# Patient Record
Sex: MALE | Race: WHITE
[De-identification: names, ages, dates, MRNs, and addresses within clinical notes are randomized per-mention and may not be internally consistent; named-entity substitution may affect disease eponyms.]

---

## 2019-03-14 ENCOUNTER — HOSPITAL ENCOUNTER (OUTPATIENT)
Dept: HOSPITAL 80 - FCATH | Age: 64
Discharge: HOME | End: 2019-03-14
Attending: INTERNAL MEDICINE
Payer: COMMERCIAL

## 2019-03-14 DIAGNOSIS — I48.1: Primary | ICD-10-CM

## 2019-03-14 DIAGNOSIS — I10: ICD-10-CM

## 2019-03-14 DIAGNOSIS — Z79.01: ICD-10-CM

## 2019-03-14 DIAGNOSIS — G47.30: ICD-10-CM

## 2019-03-14 LAB
INR PPP: 1.24 (ref 0.83–1.16)
PROTHROMBIN TIME: 15.1 SEC (ref 12–15)

## 2019-03-14 PROCEDURE — 5A2204Z RESTORATION OF CARDIAC RHYTHM, SINGLE: ICD-10-PCS | Performed by: INTERNAL MEDICINE

## 2019-03-14 NOTE — ECHO
[email or web address]





[address] [address] 

Main: [phone] 



Echocardiography Examination 

Transthoracic 



Name:            CECE SCHILLING                    MR#:

[id number]

Study Date:      03/14/2019                          Study Time:

12:06 PM

YOB: 1955                          Age:          63

year(s)

Height:          195.6 cm (77 in.)                   Weight:

114.31 kg (252 lb.)

BSA:             2.47 m2                             Gender:

Male

Examination:     Echo                                Contrast: 

Image Quality:   Adequate                            Rhythm: 

Heart Rate:                                          BP:           117

mmHg/86 mmHg

Indication:      New atrial fibrillation 



Procedure Staff 

Referring Physician: 

Echocardiographer:         Karen Arguelles TASH 

Reading Physician:         Castro Ibrahim MD 

Requesting Provider: 

Ordering Physician:        Rk Kapadia 

Indication:                New atrial fibrillation 



Measurements 

Chambers                                           AV/MV 

Label                 Value       Normal Value          Label

Value       Normal Value

IVSd, 2D               0.7 cm     (0.6cm - 1.1cm)       AV PGmean

2 mmHg

LVDd, 2D               4.8 cm     (4.2cm - 5.9cm)       AV Vmax

1.05 m/s

LVDs, 2D               3.4 cm     (2.1cm - 4cm)         MV A Vmax

0.25 m/s

LVEF, 2D               56 %       (54% - 74%)           MV E' lateral

0.12 m/s

LVEF, BP               65 %       (55% - 70%)           MV E' mean

0.1 m/s

LVEF, MOD2             63 %       (55% - 70%)           MV E' septal

0.07 m/s

LVEF, MOD4             67 %       (55% - 70%)           MV E Vmax

0.93 m/s

LVPWd, 2D              0.9 cm     (0.6cm - 1cm)         MV E/A

3.72

LA Area, A2C           31 cm2     (0cm2 - 20cm2)        MV E/E'

lateral      7.7

LA Volume, A2C         111 ml     (18ml - 58ml)         MV E/E' mean

9.79

LA Volume, A4C         104 ml     (16ml - 34ml)         MV E/E' septal

13.4 (0.45 - 1.25)

LA Volume, BP          111 ml     (18ml - 58ml)    TV/PV 

LAD Index, 2D          1.82 cm/m2                       Label

Value       Normal Value

LADs, 2D               4.5 cm     (3cm - 4cm)           RA Pressure

5 mmHg

LAESV index, MOD4      42.1 ml/m2                       RVSP

34 mmHg

Additional Vessels                                      TR Pmax

29 mmHg

Label                 Value       Normal Value          TR Vmax

2.7 m/s

AoAsc                  4 cm 

AoRoot, MM             3.6 cm     (2.2cm - 3.7cm) 



Patient: CECE SCHILLING                   MRN: [id number]

Study Date: 03/14/2019   Page 1 of 3

12:06 PM 









IVC 2.6 cm (1.2cm - 2.3cm) 



Conclusions 

A prior study is not available for comparison. The left atrial

appendage is not well seen. An intracardiac thrombus is not

identified on the basis of this transthoracic study. A cause for

atrial fibrillation is not identified on the basis of this study.



Left Ventricle: 

 LV basal inferolateral wall appears hypokinetic.  All remaining LV

segments have normal segmental motion..

 Left ventricle is normal in size.  

 Left ventricle wall thickness is normal.  

 Left ventricular diastolic function parameters are normal.  



Right Ventricle: 

 Right ventricular systolic function is normal.  



Left Atrium: 

 The left atrium is mildly dilated.  



Right Atrium: 

 The right atrium is mildly dilated.  



Mitral Valve: 

 Mild mitral regurgitation.  



Aortic Valve: 

 Aortic leaflets exhibit normal cuspal separation.  

 There is no aortic stenosis.  



Tricuspid Valve: 

 Tricuspid valve leaflets are normal in appearance and function.  

 Mild tricuspid regurgitation.  

 Right Ventricular systolic pressure is measured at 34 mmHg.  



Aorta: 

 The aortic root is borderline dilated.  

 The ascending aorta measures 4.0 cm.  



Pericardium: 

 No pericardial effusion.  



Findings 



Left Ventricle: 

LV basal inferolateral wall appears hypokinetic.  All remaining LV

segments have normal segmental motion.. Left

ventricle is normal in size. Normal global systolic left ventricular

function. The ejection fraction, measured by Simpsons

method, is 65 %. Left ventricle wall thickness is normal. There is no

regional wall motion abnormalities. Left ventricular

diastolic function parameters are normal.  

IVS: 

The septum is intact.  

Right Ventricle: 

Normal size right ventricle. Right ventricular wall thickness is

normal. Right ventricular systolic function is normal.

Left Atrium: 

The left atrium is mildly dilated.  

IAS: 



Patient: CECE SCHILLING                   MRN: [id number]

Study Date: 03/14/2019   Page 2 of 3

12:06 PM 









Normal appearing atrial septum.  

Right Atrium: 

The right atrium is mildly dilated.  

Mitral Valve: 

Normal . Mild mitral regurgitation. No mitral valve stenosis.  

Aortic Valve: 

Aortic leaflets exhibit normal cuspal separation. No aortic valve

regurgitation. There is no aortic stenosis. The aortic

valve is trileaflet.  

Tricuspid Valve: 

Tricuspid valve leaflets are normal in appearance and function. Mild

tricuspid regurgitation. No tricuspid valve stenosis.

Right Ventricular systolic pressure is measured at 34 mmHg. Pulmonary

artery pressure normal.

Pulmonic Valve: 

Pulmonic leaflets exhibit normal cuspal separation. Trivial pulmonic

valve regurgitation is present. There is no pulmonic

valve stenosis.  

Aorta: 

The aorta is normal. The aortic root is borderline dilated. The aortic

root size in M-mode measures 3.6 cm. The

ascending aorta measures 4.0 cm.  

Aorta Measurements 

AoRoot, MM is 3.6 cm.  

Pulmonary Artery: 

The pulmonary artery morphology appears normal.  

IVC: 

The inferior vena cava is normal in size and course.  

Pericardium: 

No pericardial effusion. No pleural effusion present.  



Exam Details 

Procedure Ordered:         Echo 

Procedure Status:          Routine study 

Image Quality:             Adequate 

Facility Location:         Cardiac Echo 1 



Electronically signed by Castro Ibrahim MD on 03/14/2019 at 06:20 PM 

(No Signature Object) 



Patient: CECE SCHILLING                   MRN: [id number]

Study Date: 03/14/2019   Page 3 of 3

12:06 PM 







[id number]

## 2019-03-14 NOTE — PDANEPAE
ANE History of Present Illness





a. fib for cardioversion





ANE Past Medical History





- Cardiovascular History


Hx Hypertension: Yes


Hx Arrhythmias: Yes


Hx Chest Pain: No


Hx Coronary Artery / Peripheral Vascular Disease: No


Hx CHF / Valvular Disease: No


Hx Palpitations: No





- Pulmonary History


Hx COPD: No


Hx Asthma/Reactive Airway Disease: No


Hx Recent Upper Respiratory Infection: No


Hx Oxygen in Use at Home: No


Hx Sleep Apnea: Yes





ANE Review of Systems


Review of systems is: negative


Review of Systems: 








- Exercise capacity


Exercise capacity: >=4 METS





ANE Patient History





- Allergies


Allergies/Adverse Reactions: 








No Known Allergies Allergy (Unverified 03/14/19 08:42)


 








- Home Medications


Home medications: home medication list seen and reviewed


Home Medications: 








Allopurinol 300 mg PO 03/14/19 [Last Taken Unknown]


Amiodarone HCl 400 mg PO BID 03/14/19 [Last Taken Unknown]


Eliquis 5 mg PO BID 03/14/19 [Last Taken Unknown]


Lisinopril 20 mg PO 03/14/19 [Last Taken Unknown]


Metoprolol Tartrate 50 mg PO 03/14/19 [Last Taken Unknown]








- Anes Hx


Anes Hx: no prior problems





ANE Physical Exam





- Airway


Neck exam: FROM


Mallampati Score: Class 1


Mouth exam: normal dental/mouth exam





- Pulmonary


Pulmonary: no respiratory distress





- Cardiovascular


Cardiovascular: regular rate and rhythym





- ASA Status


ASA Status: III





ANE Anesthesia Plan


Anesthesia Plan: GA with mask

## 2019-03-14 NOTE — PDGENHP
History & Physical


Chief Complaint: persistent AF


History of Present Illness: persistent AF, on eliquis for >6 weeks without 

interruption


Relevant Physical Exam: NAD; irr irr/NR, no MRG; CTAB; A+Ox4


Cardiorespiratory Assessment: persistent AF -> cardioversion

## 2019-03-14 NOTE — EPPROC
Electrophysiology Procedure Note: 


Date: 3/14/19





Indications: 64yo M with persistent atrial fibrillation, on uninterrupted 

eliquis for >6 weeks, presenting for cardioversion.





: Chang Kapadia MD





Procedures: DC cardioversion [id number]





Techniques: Following informed consent, the patient was brought to the EP lab 

in a fasting nonsedated state, in AF rhythm. General anesthesia was provided by 

the anesthesiology service. Defibrillation pads were applied in an AP 

orientation to the chest. After ensuring adequate sedation, a single 200J 

biphasic synchronized transcutaneous shock was applied, resulting in 

termination of AF with resumption of sinus rhythm with occasional PACs. The 

patient tolerated the procedure well.





EBL: minimal


Complications: none





Plan:


-continue eliquis and amiodarone


-echo prior to DC to home today


-RTC 1-2 weeks to discuss long-term rhythm control options


Patient Problems: 


 Problems











Problem Status Onset


 


Persistent atrial fibrillation Acute

## 2019-04-11 ENCOUNTER — HOSPITAL ENCOUNTER (OUTPATIENT)
Dept: HOSPITAL 80 - FCATH | Age: 64
Setting detail: OBSERVATION
LOS: 1 days | Discharge: HOME | End: 2019-04-12
Attending: INTERNAL MEDICINE | Admitting: INTERNAL MEDICINE
Payer: COMMERCIAL

## 2019-04-11 DIAGNOSIS — I48.1: Primary | ICD-10-CM

## 2019-04-11 DIAGNOSIS — G47.30: ICD-10-CM

## 2019-04-11 DIAGNOSIS — I10: ICD-10-CM

## 2019-04-11 LAB
INR PPP: 1.05 (ref 0.83–1.16)
PLATELET # BLD: 148 10^3/UL (ref 150–400)
PROTHROMBIN TIME: 13.3 SEC (ref 12–15)

## 2019-04-11 PROCEDURE — B246ZZ3 ULTRASONOGRAPHY OF RIGHT AND LEFT HEART, INTRAVASCULAR: ICD-10-PCS | Performed by: INTERNAL MEDICINE

## 2019-04-11 PROCEDURE — 93306 TTE W/DOPPLER COMPLETE: CPT

## 2019-04-11 PROCEDURE — 93655 ICAR CATH ABLTJ DSCRT ARRHYT: CPT

## 2019-04-11 PROCEDURE — 93623 PRGRMD STIMJ&PACG IV RX NFS: CPT

## 2019-04-11 PROCEDURE — 93613 INTRACARDIAC EPHYS 3D MAPG: CPT

## 2019-04-11 PROCEDURE — 02583ZZ DESTRUCTION OF CONDUCTION MECHANISM, PERCUTANEOUS APPROACH: ICD-10-PCS | Performed by: INTERNAL MEDICINE

## 2019-04-11 PROCEDURE — B245ZZ4 ULTRASONOGRAPHY OF LEFT HEART, TRANSESOPHAGEAL: ICD-10-PCS | Performed by: INTERNAL MEDICINE

## 2019-04-11 PROCEDURE — 93656 COMPRE EP EVAL ABLTJ ATR FIB: CPT

## 2019-04-11 PROCEDURE — 3E033KZ INTRODUCTION OF OTHER DIAGNOSTIC SUBSTANCE INTO PERIPHERAL VEIN, PERCUTANEOUS APPROACH: ICD-10-PCS | Performed by: INTERNAL MEDICINE

## 2019-04-11 PROCEDURE — 02K83ZZ MAP CONDUCTION MECHANISM, PERCUTANEOUS APPROACH: ICD-10-PCS | Performed by: INTERNAL MEDICINE

## 2019-04-11 PROCEDURE — C1893 INTRO/SHEATH, FIXED,NON-PEEL: HCPCS

## 2019-04-11 PROCEDURE — 93005 ELECTROCARDIOGRAM TRACING: CPT

## 2019-04-11 PROCEDURE — C1732 CATH, EP, DIAG/ABL, 3D/VECT: HCPCS

## 2019-04-11 PROCEDURE — 4A023FZ MEASUREMENT OF CARDIAC RHYTHM, PERCUTANEOUS APPROACH: ICD-10-PCS | Performed by: INTERNAL MEDICINE

## 2019-04-11 PROCEDURE — 92960 CARDIOVERSION ELECTRIC EXT: CPT

## 2019-04-11 PROCEDURE — 93662 INTRACARDIAC ECG (ICE): CPT

## 2019-04-11 PROCEDURE — C1730 CATH, EP, 19 OR FEW ELECT: HCPCS

## 2019-04-11 PROCEDURE — G0378 HOSPITAL OBSERVATION PER HR: HCPCS

## 2019-04-11 PROCEDURE — 93312 ECHO TRANSESOPHAGEAL: CPT

## 2019-04-11 RX ADMIN — COLCHICINE SCH MG: 0.6 CAPSULE ORAL at 22:23

## 2019-04-11 RX ADMIN — APIXABAN SCH MG: 5 TABLET, FILM COATED ORAL at 22:23

## 2019-04-11 NOTE — ECHO
[email or web address]





[place] [id number] 

Main: [phone] 



Echocardiography Examination 

Transesophageal 



Name:            CECE SCHILLING                      MR#:

[id number]

Study Date:      04/11/2019                            Study Time:

12:54 PM

YOB: 1955                            Age:

63 year(s)

Height:             (  )                               Weight:

(  )

BSA:                                                   Gender:

Male

Examination:     LUCY                                   Contrast: 

Image Quality:   Adequate                              Rhythm: 

Heart Rate:                                            BP:

/

Indication:      EP Study 



Procedure Staff 

Referring Physician: 

Echocardiographer:          [facility] 

Reading Physician:          Rk Kapadia MD 

Requesting Provider: 

Ordering Physician:         Rk Kapadia MD 

Indication:                 EP Study 

Acute complication:         None 



Conclusions 



Left Atrium Appendage: 

 Normal PW-Doppler flow pattern.  

 No thrombus is identified.  



IAS: 

 An agitated saline study was performed and was negative for

intracardiac shunting.



Findings 



Left Ventricle: 

Left ventricle is normal in size. Mildly reduced systolic left

ventricular function. EF range is estimated at 40 % - 50 %.

Right Ventricle: 

Normal size right ventricle. Right ventricular systolic function is

normal.

Left Atrium Appendage: 

Normal PW-Doppler flow pattern. Good color flow doppler in the left

atrial appendage. No thrombus is identified.

IAS: 

An agitated saline study was performed and was negative for

intracardiac shunting.

Mitral Valve: 

Mitral valve appears structurally normal. Trivial to mild mitral

regurgitation.

Aortic Valve: 

Patient: CECE SCHILLING                    MRN: [id number]

Study Date: 04/11/2019   Page 1 of 2



12:54 PM 









Aortic leaflets are structurally normal. No significant aortic valve

regurgitation.

Tricuspid Valve: 

Tricuspid valve leaflets are structurally normal. Mild tricuspid

regurgitation.

Pulmonic Valve: 

Pulmonic leaflets are structurally normal. No significant pulmonic

valve regurgitation is evident.

Pericardium: 

No pericardial effusion.  



Exam Details 

Procedure Ordered:          LUCY 

Procedure Status:           Routine study 

Image Quality:              Adequate 

Consent:                    Risks, alternatives of procedure explained

to patient, informed consent obtained

Probe Insertion:            Attending cardiologist 

Facility Location:          EP Lab 



Electronically signed by Rk Kapadia MD on 04/11/2019 at 04:58

PM

(No Signature Object) 



Patient: CECE SCHILLING                    MRN: [id number]

Study Date: 04/11/2019   Page 2 of 2

12:54 PM 







[id number]

## 2019-04-11 NOTE — CPEKG
Test Reason : OPEN

Blood Pressure : ***/*** mmHG

Vent. Rate : 078 BPM     Atrial Rate : 078 BPM

   P-R Int : 156 ms          QRS Dur : 099 ms

    QT Int : 415 ms       P-R-T Axes : 071 -03 029 degrees

   QTc Int : 473 ms

 

Sinus rhythm

 

Confirmed by Bhaskar Love ([id number]) on 4/11/2019 8:16:36 PM

 

Referred By: Rk Kapadia           Confirmed By:Bhaskar Love

## 2019-04-11 NOTE — PDGENHP
History & Physical


Chief Complaint: persistent afib


History of Present Illness: persistent afib, refractory to treatment with 

antiarrhythmics


Relevant Physical Exam: A+Ox4, no focal deficits, irr irr/normal rate, no MRG


Cardiorespiratory Assessment: persistent AFib -> GETA/LUCY/catheter ablatoin

## 2019-04-11 NOTE — EPPROC
Electrophysiology Procedure Note: 





Date:  4/11/2019





:  Chang Kapadia MD





Procedures:


Comprehensive EP study and catheter ablation of atrial fibrillation via 4 vein 

PVI -74365


Ablation of cava tricuspid isthmus -56979


Intracardiac echo -99509


3D electro anatomic mapping -03792


Attempted induction of arrhythmia following drug infusion -93128


Transesophageal ECHO -85603





Indications:  63-year-old male with symptomatic persistent atrial fibrillation, 

refractory to treatment with antiarrhythmics.





Techniques:  Following informed consent, the patient was brought to the EP lab 

in a fasting nonsedated state, in atrial fibrillation rhythm.  General 

anesthesia was provided by the anesthesiology service.  Transesophageal 

echocardiography was performed, demonstrating the absence of left atrial 

appendage thrombus (please see the full report for details).  An esophageal 

temperature probe was inserted.  Bilateral groins were prepped and draped in 

usual sterile fashion.  1% lidocaine was infiltrated over the right femoral 

vessels.  Under ultrasound guidance, vascular access was obtained in the right 

femoral vein x3 (2 8 Azeri sheaths and 1 SL 0 sheath) and in the right femoral 

artery x1 (4 Azeri sheath).  Weight based heparin bolus and drip was 

administered, targeting -350 seconds.  A Sound Star intracardiac echo 

catheter was inserted and advanced to right atrium.  A Carto sound map was 

created of the CS ostium, left atrium, pulmonary veins (which showed normal 

anatomic relationship), and left atrial appendage.  A [employer] transseptal needle 

was inserted in the SL 0 sheath and the system was positioned at the fossa 

ovalis under intracardiac echo guidance.  The fossa was crossed, and the sheath 

was advanced to the mid LA.  The sheath was connected to continuous saline 

irrigation.  A decapolar catheter was advanced to the CS.  The patient was then 

cardioverted out of atrial fibrillation is eating a single 200 joule biphasic R-

wave synchronized transcutaneous shock; CS pacing was initiated, and used 

through the rest of the procedure to suppress atrial ectopy.  A PentaRay 

catheter was inserted in the transseptal sheath, and used to create a Carto map 

of the left atrium, pulmonary veins, left atrial appendage.  There was no 

significant scar burden in the posterior LA wall, and the decision was made not 

to pursue posterior wall isolation.  The PentaRay catheter was replaced with a 

Smart Touch SF ablation catheter.  RF ablation was performed at 40 w power/

short duration on the posterior wall, and 50 w power/short duration on the 

anterior wall and roof.  The left pulmonary veins, then the right pulmonary 

veins, were encircled en bloc.  There were no significant temperature rises in 

the esophagus during posterior wall ablation.  Following RF ablation, the 

ablation catheter was removed and the PentaRay was reinserted.  Each of the 4 

pulmonary veins was interrogated for entrance and exit block using local 

mapping and pacing within each vein.  Adenosine 6 mg IV x2 was administered, 

once for each set of pulmonary veins, and entrance/exit block was reconfirmed 

with pacing in the coronary sinus and from within each vein.  Equipment was 

then withdrawn from the left atrium.  The ablation catheter was reinserted, and 

a CARTO map was created of the RA.  The his position was annotated (HV=60ms).  

Baseline transisthmus time was 70 milliseconds.  RF ablation was performed 

along the CT a at 35 w power, during CS proximal bipole pacing, to monitor 

transisthmus conduction.  Of note, the right coronary artery demonstrated a 

shallow course along the distal CTI, and intracardiac echo was used to guide 

ablation in this location, to minimize risk of RCA injury.  With CTI ablation, 

there was slowing of transisthmus conduction, followed by transisthmus block.  

This was confirmed with lateral RA mapping during CS proximal pacing, and with 

differential pacing from the lateral RA.  Transisthmus time equals 195 

milliseconds medial to lateral, 176 milliseconds lateral to medial.


At the completion of the procedure, intracardiac echo survey showed no 

pericardial effusion.  All catheters were removed.  A temporary hemostasis 

suture was applied to the right groin access site, and all sheaths were 

aspirated and removed; manual pressure was held until hemostasis.  The patient 

tolerated the procedure well.





EBL:  Minimal


Complications:  None





Assessment:


Successful ablation of persistent atrial fibrillation via 4 vein PVI and CTI 

ablation.


Prolonged HV interval.





Plan:


Bedrest for 6 hr post procedure


Remove hemostasis suture at the completion of bedrest


Resume Eliquis at the completion of bedrest, continue for at least 3 months 

post ablation


Stopping amiodarone due to prolonged HV interval


Omeprazole and colchicine postablation


Right groin precautions for 10 days


Patient Problems: 


 Problems











Problem Status Onset


 


Persistent atrial fibrillation Acute

## 2019-04-11 NOTE — PDANEPAE
ANE Past Medical History





- Cardiovascular History


Hx Hypertension: Yes


Hx Arrhythmias: Yes


Hx Chest Pain: No


Hx Coronary Artery / Peripheral Vascular Disease: No


Hx CHF / Valvular Disease: No


Hx Palpitations: No





- Pulmonary History


Hx COPD: No


Hx Asthma/Reactive Airway Disease: No


Hx Recent Upper Respiratory Infection: No


Hx Oxygen in Use at Home: No


Hx Sleep Apnea: Yes





- Neurologic History


Hx Cerebrovascular Accident: No





- Endocrine History


Hx Diabetes: No


Hypothyroid: No





- Renal History


Hx Renal Disorders: No





- Liver History


Hx Hepatic Disorders: No





- Neurological & Psychiatric Hx


Hx Neurological and Psychiatric Disorders: No





- Cancer History


Hx Cancer: No





- GI History


GERD: no





ANE Review of Systems


Review of Systems: 








ANE Patient History





- Allergies


Allergies/Adverse Reactions: 








No Known Allergies Allergy (Unverified 03/14/19 08:42)


 








- Home Medications


Home Medications: 








Allopurinol [Allopurinol 300 MG (RX)] 300 mg PO DAILY 03/14/19 [Last Taken 04/10

/19 08:00]


Amiodarone HCl [Pacerone (*)] 200 mg PO DAILY 03/14/19 [Last Taken 04/08/19 21:

00]


Apixaban [Eliquis] 5 mg PO BID 03/14/19 [Last Taken 04/09/19 21:00]


Lisinopril [Zestril 20 mg (*)] 20 mg PO DAILY 03/14/19 [Last Taken 04/10/19 08:

00]


Omeprazole 20 mg PO HS 04/04/19 [Last Taken 04/10/19 21:00]








- Smoking Hx


Smoking Status: Never smoked





ANE Labs/Vital Signs





- Labs


Result Diagrams: 


 04/11/19 11:27





 04/11/19 11:27





- Vital Signs


Height: 196 cm


Weight: 112.5 kg





ANE Physical Exam





- Airway


Neck exam: FROM


Mallampati Score: Class 2


Mouth exam: normal dental/mouth exam





- Pulmonary


Pulmonary: no respiratory distress, no rales or rhonchi, clear to auscultation





- Cardiovascular


Cardiovascular: no murmur, rub, or gallop, irregularly irregular





- ASA Status


ASA Status: II





ANE Anesthesia Plan


Anesthesia Plan: general endotracheal anesthesia

## 2019-04-11 NOTE — CPEKG
Test Reason : OPEN

Blood Pressure : ***/*** mmHG

Vent. Rate : 109 BPM     Atrial Rate : 173 BPM

   P-R Int : 140 ms          QRS Dur : 090 ms

    QT Int : 355 ms       P-R-T Axes : 000 -06 024 degrees

   QTc Int : 479 ms

 

Atrial fibrillation

Borderline prolonged QT interval

 

Confirmed by Bhaskar Love ([id number]) on 4/11/2019 8:15:32 PM

 

Referred By: Rk Kapadia           Confirmed By:Bhaskar Love

## 2019-04-12 VITALS — DIASTOLIC BLOOD PRESSURE: 88 MMHG | SYSTOLIC BLOOD PRESSURE: 126 MMHG

## 2019-04-12 LAB
CK SERPL-CCNC: 82 IU/L (ref 0–224)
PLATELET # BLD: 138 10^3/UL (ref 150–400)

## 2019-04-12 RX ADMIN — APIXABAN SCH MG: 5 TABLET, FILM COATED ORAL at 08:52

## 2019-04-12 RX ADMIN — COLCHICINE SCH MG: 0.6 CAPSULE ORAL at 08:52

## 2019-04-12 NOTE — CPEKG
Test Reason : OPEN

Blood Pressure : ***/*** mmHG

Vent. Rate : 071 BPM     Atrial Rate : 071 BPM

   P-R Int : 167 ms          QRS Dur : 098 ms

    QT Int : 420 ms       P-R-T Axes : 054 014 011 degrees

   QTc Int : 457 ms

 

Sinus rhythm

 

Confirmed by Bhaskar Love ([id number]) on 4/12/2019 5:49:00 PM

 

Referred By: Rk Kapadia           Confirmed By:Bhaskar Love

## 2019-04-12 NOTE — ECHO
[email or web address]





[address] [address] 

Main: [phone] 



Echocardiography Examination 

Transthoracic 



Name:           CECE SCHILLING                    MR#:

[id number]

Study Date:     04/12/2019                          Study Time:

07:32 AM

YOB: 1955                          Age:          63

year(s)

Height:         195.6 cm (77 in.)                   Weight:

112.49 kg (248 lb.)

BSA:            2.45 m2                             Gender:       Male



Examination:    Echo                                Contrast: 

Image Quality:  Adequate                            Rhythm: 

Heart Rate:                                         BP:           113

mmHg/74 mmHg

Indication:     F/U post EP study 



Procedure Staff 

Referring Physician: 

Echocardiographer:         Karen Arguelles [facility] 

Reading Physician:         Rk Kapadia MD 

Requesting Provider: 

Ordering Physician:        Rk Kapadia MD 

Indication:                F/U post EP study 



Measurements 

Chambers                                          AV/MV 

Label                 Value       Normal Value          Label

Value       Normal Value

LVDd, 2D              5.5 cm      (4.2cm - 5.9cm)       AV PGmean

3 mmHg

LVDs, 2D              3.8 cm      (2.1cm - 4cm)         AV Vmax

1.24 m/s

IVSd, 2D              0.9 cm      (0.6cm - 1.1cm)       MV E Vmax

0.96 m/s

LVPWd, 2D             1 cm        (0.6cm - 1cm)         MV A Vmax

0.35 m/s

LVEF, BP              61 %        (55% - 70%)           MV E/A

2.74

LVEF, 2D              58 %        (54% - 74%)           MV E/E'

lateral      11

LA Volume, BP         119 ml      (18ml - 58ml)         MV E/E' septal

12 (0.45 - 1.25)

LADs, 2D              4.4 cm      (3cm - 4cm)           MV E' septal

0.08 m/s

LAESV index, BP       48.6 ml/m2                        MV E' lateral

0.09 m/s

Additional Vessels                                      MV E/E' mean

11.29

Label                 Value       Normal Value          MV E' mean

0.08 m/s

AoAsc                 3.6 cm                      TV/PV 

AoRoot, MM            3.7 cm      (2.2cm - 3.7cm)       Label

Value       Normal Value



RA Pressure          5 mmHg 

RVSP                 34 mmHg 

TR Pmax              29 mmHg 

TR Vmax              2.71 m/s 



Patient: CECE SCHILLING                  MRN: [id number]

Study Date: 04/12/2019   Page 1 of 2

07:32 AM 









Conclusions 



Pericardium: 

 No pericardial effusion.  



Findings 



Left Ventricle: 

Diastolic dysfunction is indeterminate.. Left ventricle is normal in

size. Normal global systolic left ventricular function.

The ejection fraction, measured by Simpsons method, is 61 %. EF range

is estimated at 55 % - 60 %. Left ventricle

wall thickness is normal. There are no regional wall motion

abnormalities.

IVS: 

The septum is intact.  

Right Ventricle: 

Normal size right ventricle. Right ventricular wall thickness is

normal. Right ventricular systolic function is normal.

Left Atrium: 

The left atrium is moderately dilated.  

IAS: 

Normal appearing atrial septum.  

Right Atrium: 

The right atrium is mildly dilated.  

Mitral Valve: 

Mitral valve appears structurally normal. Mild mitral regurgitation.

No mitral valve stenosis.

Aortic Valve: 

Aortic leaflets exhibit normal cuspal separation. No aortic valve

regurgitation. There is no aortic stenosis. The aortic

valve is trileaflet.  

Tricuspid Valve: 

Tricuspid valve leaflets are normal in appearance and function. Mild

tricuspid regurgitation. No tricuspid valve stenosis.

Right Ventricular systolic pressure is measured at 34 mmHg. Pulmonary

artery pressure normal.

Pulmonic Valve: 

Pulmonic leaflets exhibit normal cuspal separation. Trivial pulmonic

valve regurgitation is present. There is no pulmonic

valve stenosis.  

Aorta: 

The aorta is normal. The aortic root size in M-mode measures 3.7 cm.

The ascending aorta measures 3.6 cm.

Aorta Measurements 

AoRoot, MM is 3.7 cm.  

Pulmonary Artery: 

The pulmonary artery morphology appears normal.  

IVC: 

The inferior vena cava is normal in size and course.  

Pericardium: 

No pericardial effusion. No pleural effusion present.  



Exam Details 

Procedure Ordered:         Echo 

Procedure Status:          Routine study 

Image Quality:             Adequate 

Facility Location:         Cardiac Echo 1 



Electronically signed by Rk Kapadia MD on 04/12/2019 at 09:02

AM

(No Signature Object) 



Patient: CECE SCHILLING                  MRN: [id number]

Study Date: 04/12/2019   Page 2 of 2

07:32 AM 







[id number]

## 2019-07-13 NOTE — GDS
0745 - Bedside shift report received from [place], [facility]. Assumed care of patient. Patient noted resting in bed at this time. Call light in reach. 0848 - PRN Percocet administered for c/o 6/10 pain to RUE. Patient assisted onto bedside commode, voided without difficulty, and returned to bed with assist x2. LUE elevated on pillows and ice packs applied. Assessment completed. Patient alert and oriented x 4. Cap refills < 3 sec. Pulses palpable and equal bilaterally. Lung sounds clear, but diminished. Respirations even and unlabored. Abd soft and nontender. Bowel sounds active to all 4 quads. Patient reports constipation. Prune juice administered. Patient voiding without difficulty. Skin warm and dry. Splint dressing to LUE noted CDI. Non-pitting + 3 swelling noted to left hand. IV to right FA, site CDI. SCD's to BLE. Patient resting in bed with call light in reach. Visitor present. 1307 - Call out to  to clarify if patient can be discharged. Still awaiting authorization approval. Patient may be here through weekend. 1545 - PRN Tylenol administered for c/o pain 5/10 to LUE. Patient's LUE repositioned and eolevated on more pillows. Family at bedside. Call light in reach. 1925 - PRN percocet administered for c/o 6/10 pain to LUE. Patient resting in bed with LUE elevated on pillow. Family member at bedside. Call light in reach. [f 
rep st]



                                                             DISCHARGE SUMMARY





DISCHARGE DIAGNOSES:  

1.  Persistent atrial fibrillation.

2.  Status post atrial fibrillation ablation.

3.  Hypertension.

4.  Sleep apnea.



BRIEF HISTORY:  This is a 63-year-old man with history of symptomatic, 
persistent atrial fibrillation that has been refractory to anti-arrhythmics.  
He has had 2 prior cardioversions, and has reverted back into atrial 
fibrillation shortly thereafter, despite being started on amiodarone for the 
second one.



HOSPITAL COURSE:  Dr. Kapadia performed ablation of the cavotricuspid isthmus 
as well as isolation of the 4 pulmonary veins with radiofrequency ablation.  
There were no complications.  The patient has done fairly well overnight.  He 
does have some mild generalized chest discomfort.  He also reports a little bit 
of lung congestion.  He has not had any arrhythmias, groin site pain, or leg 
pain.  There has been no bleeding from his groin site.  Telemetry has 
demonstrated sinus rhythm with no atrial fibrillation.



TESTING DONE:  Echocardiogram demonstrated ejection fraction of 55% to 60%.  No 
pericardial effusion.  Left atrium is moderately dilated, and there is mild MR.
  



EKG today demonstrates sinus rhythm without ST or T-wave changes.



LAB WORK:  WBC is 12.83, hemoglobin 14.1, hematocrit is 42.5, platelets 138, 
sodium 135, potassium 4.6, chloride 102, bicarb 24, BUN 19, creatinine 1, 
glucose 121, CK 82.  Troponin 0.966.



PHYSICAL EXAMINATION:  VITAL SIGNS:  Blood pressure is 126/88, pulse is 72, 
respirations 18.  He is afebrile with temperature of 36.8, O2 saturation is 93% 
on room air.  GENERAL:  He is alert and oriented, lying in bed, in no acute 
distress.  CARDIAC:  Regular rate and rhythm without murmur, rub, or gallop.  
LUNGS:  Clear to auscultation.  ABDOMEN:  Soft and nontender.  EXTREMITIES:  
Warm.  No discoloration.  Bilateral +2 pedal pulses.  No lower extremity edema.
  Groin site, 1 stitch was removed without problem.  There is no bleeding, no 
swelling, and no bruising.



DISCHARGE INSTRUCTIONS:  Patient was given verbal and written instructions for 
post ablation restrictions/ groin precautions. He was also given an incentive 
spirometer with instructions to use every 2 hours while awake for the next few 
days.



DISCHARGE MEDICATIONS:  Please see discharge medication reconciliation.  



Of note, he will continue omeprazole 20 mg daily for 6 weeks post ablation.  He 
will take colchicine 0.6 mg tablet, half a tablet, twice a day for 2 weeks post 
ablation, and continue with Eliquis for at least 3 months.  



FOLLOWUP:  He has a followup scheduled May 10, at 4 o'clock with Dr. Kapadia.



ADDENDUM: Agree with Ms Dunn's assessment and plan.  Uneventful postop 
observation status post catheter ablation of atrial fibrillation.



Job #:  [id number]

MTDD